# Patient Record
Sex: FEMALE | Race: WHITE | NOT HISPANIC OR LATINO | Employment: OTHER | URBAN - NONMETROPOLITAN AREA
[De-identification: names, ages, dates, MRNs, and addresses within clinical notes are randomized per-mention and may not be internally consistent; named-entity substitution may affect disease eponyms.]

---

## 2017-07-28 ENCOUNTER — OFFICE VISIT (OUTPATIENT)
Dept: MEDICAL GROUP | Facility: CLINIC | Age: 82
End: 2017-07-28
Payer: MEDICARE

## 2017-07-28 VITALS
HEART RATE: 74 BPM | RESPIRATION RATE: 18 BRPM | DIASTOLIC BLOOD PRESSURE: 72 MMHG | TEMPERATURE: 97 F | SYSTOLIC BLOOD PRESSURE: 126 MMHG | HEIGHT: 67 IN | WEIGHT: 197 LBS | BODY MASS INDEX: 30.92 KG/M2 | OXYGEN SATURATION: 96 %

## 2017-07-28 DIAGNOSIS — Z12.39 SCREENING FOR BREAST CANCER: ICD-10-CM

## 2017-07-28 DIAGNOSIS — Z12.11 SCREENING FOR COLON CANCER: ICD-10-CM

## 2017-07-28 DIAGNOSIS — H90.0 CONDUCTIVE HEARING LOSS, BILATERAL: ICD-10-CM

## 2017-07-28 DIAGNOSIS — E66.9 OBESITY (BMI 30-39.9): ICD-10-CM

## 2017-07-28 DIAGNOSIS — G89.29 CHRONIC LEFT-SIDED THORACIC BACK PAIN: ICD-10-CM

## 2017-07-28 DIAGNOSIS — M85.80 OSTEOPENIA, UNSPECIFIED LOCATION: ICD-10-CM

## 2017-07-28 DIAGNOSIS — Z76.89 ESTABLISHING CARE WITH NEW DOCTOR, ENCOUNTER FOR: ICD-10-CM

## 2017-07-28 DIAGNOSIS — M54.6 CHRONIC LEFT-SIDED THORACIC BACK PAIN: ICD-10-CM

## 2017-07-28 DIAGNOSIS — E03.9 ACQUIRED HYPOTHYROIDISM: ICD-10-CM

## 2017-07-28 DIAGNOSIS — R60.9 TRACE EDEMA: ICD-10-CM

## 2017-07-28 DIAGNOSIS — L30.9 DERMATITIS: ICD-10-CM

## 2017-07-28 DIAGNOSIS — Z23 NEED FOR VACCINATION: ICD-10-CM

## 2017-07-28 DIAGNOSIS — Z87.2 HISTORY OF CELLULITIS: ICD-10-CM

## 2017-07-28 PROCEDURE — 99214 OFFICE O/P EST MOD 30 MIN: CPT | Mod: 25 | Performed by: NURSE PRACTITIONER

## 2017-07-28 PROCEDURE — 90472 IMMUNIZATION ADMIN EACH ADD: CPT | Performed by: NURSE PRACTITIONER

## 2017-07-28 PROCEDURE — G0009 ADMIN PNEUMOCOCCAL VACCINE: HCPCS | Performed by: NURSE PRACTITIONER

## 2017-07-28 PROCEDURE — 90670 PCV13 VACCINE IM: CPT | Performed by: NURSE PRACTITIONER

## 2017-07-28 PROCEDURE — 90715 TDAP VACCINE 7 YRS/> IM: CPT | Performed by: NURSE PRACTITIONER

## 2017-07-28 RX ORDER — LEVOTHYROXINE SODIUM 0.03 MG/1
25 TABLET ORAL
Qty: 90 TAB | Refills: 1 | Status: SHIPPED | OUTPATIENT
Start: 2017-07-28 | End: 2018-02-01 | Stop reason: SDUPTHER

## 2017-07-28 RX ORDER — PHENOL 1.4 %
1 AEROSOL, SPRAY (ML) MUCOUS MEMBRANE DAILY
Qty: 30 TAB | Refills: 0 | COMMUNITY
Start: 2017-07-28

## 2017-07-28 RX ORDER — TRIAMCINOLONE ACETONIDE 1 MG/G
1 CREAM TOPICAL 2 TIMES DAILY
Qty: 1 TUBE | Refills: 0 | Status: SHIPPED | OUTPATIENT
Start: 2017-07-28 | End: 2018-09-04

## 2017-07-28 ASSESSMENT — PATIENT HEALTH QUESTIONNAIRE - PHQ9: CLINICAL INTERPRETATION OF PHQ2 SCORE: 0

## 2017-07-28 ASSESSMENT — PAIN SCALES - GENERAL: PAINLEVEL: NO PAIN

## 2017-07-28 NOTE — ASSESSMENT & PLAN NOTE
This is a chronic problem. Per patient, edema is always resolved in the AM, worsened with activity. No history of HTN.

## 2017-07-28 NOTE — ASSESSMENT & PLAN NOTE
This is a chronic problem which has been well controlled with Levothyroxine 25 mcg. Last TSH was a year ago, WNL. Patient denies any concerns or complaints today.

## 2017-07-28 NOTE — ASSESSMENT & PLAN NOTE
Patient reports history of LE cellulitis due to dry skin. None today. Reports she uses lotion daily.

## 2017-07-28 NOTE — PATIENT INSTRUCTIONS
Please obtain fasting labs prior to your next appointment. You may report to our clinic here in [place] Monday-Friday from 7:00am - 9:00am.     Please arrive fasting. Please do not eat or drink anything other than water for 8 hours prior to your arrival for labs.     Your medical care was provided today by: PORSCHE Santiago    Thank You for the opportunity to serve you.    You may receive a brief survey in the mail shortly regarding your visit today. Please take a few moments to complete the survey and return it; no postage is necessary. We are working to serve our patient population better, improve customer service and our patients overall experience and your input can help us to accomplish this. We thank you for your help and for the opportunity to serve you today and in the future.     Special Instructions:  Always call 9-1-1 immediately if you develop a life threatening emergency.    Unless told otherwise please take all medications as directed and complete prescription therapies.     Watch for the following signs that require additional evaluation: progressive lethargy or unresponsiveness, localized pain (chest, abdomen), shortness of breath, painful breathing, progressive vomiting with weakness, bloody stools, or new rash.     If you are prescribed pain medication or any other medication that is sedating, do not take medication before or while operating a vehicle or heavy machinery or equipment due to potential side effects such as drowsiness and/or dizziness.

## 2017-07-28 NOTE — ASSESSMENT & PLAN NOTE
This is a new problem. Per patient, a patch of itchy skin on upper right arm over the last few months, it comes and goes. No pain, bleeding, no change in mole or lesion to report.

## 2017-07-28 NOTE — MR AVS SNAPSHOT
"        Carolina Lamineportillowhitney   2017 1:00 PM   Office Visit   MRN: [id number]    Department:  [facility] Phone:  [phone]    Description:  Female : 1932   Provider:  CHRIS Saleh           Reason for Visit     Establish Care     Orders Needed mammo/bone density/fit test    Immunizations tdap/pneumonia      Allergies as of 2017     Allergen Noted Reactions    Other Drug 2016       ALL NARCOTICS    Bactrim Ds 2016   Hives      You were diagnosed with     Establishing care with new doctor, encounter for   [878488]       Obesity (BMI 30-39.9)   [625171]       Acquired hypothyroidism   [7259568]       Chronic left-sided thoracic back pain   [4073993]       History of cellulitis   [6087817]       Osteopenia, unspecified location   [5455587]       Conductive hearing loss, bilateral   [389.06.ICD-9-CM]       Trace edema   [9424538]       Dermatitis   [103535]       Screening for breast cancer   [974010]       Screening for colon cancer   [596754]       Need for vaccination   [684534]         Vital Signs     Blood Pressure Pulse Temperature Respirations Height Weight    126/72 mmHg 74 36.1 °C (97 °F) 18 1.702 m (5' 7.01\") 89.359 kg (197 lb)    Body Mass Index Oxygen Saturation Smoking Status             30.85 kg/m2 96% Never Smoker          Basic Information     Date Of Birth Sex Race Ethnicity Preferred Language    1932 Female White Non- English      Problem List              ICD-10-CM Priority Class Noted - Resolved    Chronic left-sided thoracic back pain M54.6, G89.29   5/3/2016 - Present    Acquired hypothyroidism E03.9   5/3/2016 - Present    Obesity (BMI 30-39.9) E66.9   2017 - Present    History of cellulitis Z87.2   2017 - Present    Osteopenia M85.80   2017 - Present    Conductive hearing loss, bilateral H90.0   2017 - Present    Trace edema R60.9   2017 - Present    Dermatitis L30.9   2017 - Present      Health " Maintenance        Date Due Completion Dates    COLON CANCER SCREENING ANNUAL FIT 7/25/1982 ---    BONE DENSITY 7/25/1997 ---    MAMMOGRAM 11/10/2016 11/10/2015, 9/2/2011    PAP SMEAR 8/1/2018 (Originally 7/25/1953) ---    IMM ZOSTER VACCINE 8/1/2018 (Originally 7/25/1992) ---    IMM INFLUENZA (1) 9/1/2017 ---    IMM PNEUMOCOCCAL 65+ (ADULT) LOW/MEDIUM RISK SERIES (2 of 2 - PPSV23) 7/28/2018 7/28/2017    IMM DTaP/Tdap/Td Vaccine (2 - Td) 7/28/2027 7/28/2017            Current Immunizations     13-VALENT PCV PREVNAR 7/28/2017    Tdap Vaccine 7/28/2017      Below and/or attached are the medications your provider expects you to take. Review all of your home medications and newly ordered medications with your provider and/or pharmacist. Follow medication instructions as directed by your provider and/or pharmacist. Please keep your medication list with you and share with your provider. Update the information when medications are discontinued, doses are changed, or new medications (including over-the-counter products) are added; and carry medication information at all times in the event of emergency situations     Allergies:  OTHER DRUG - (reactions not documented)     BACTRIM DS - Hives               Medications  Valid as of: July 28, 2017 -  1:48 PM    Generic Name Brand Name Tablet Size Instructions for use    Levothyroxine Sodium (Tab) SYNTHROID 25 MCG Take 1 Tab by mouth Every morning on an empty stomach. ON EMPTY STOMACH        Multiple Vitamins-Minerals (Tab) MULTIVITAMIN ADULTS 50+  Take 1 Tab by mouth every day.        Triamcinolone Acetonide (Cream) KENALOG 0.1 % Apply 1 Application to affected area(s) 2 times a day.        .                 Medicines prescribed today were sent to:     [facility] #[id number] - [place] - [address]    [address] [place] [id number]    Phone: [phone] Fax: [phone]    Open 24 Hours?: No      Medication refill instructions:       If your prescription bottle indicates  you have medication refills left, it is not necessary to call your provider’s office. Please contact your pharmacy and they will refill your medication.    If your prescription bottle indicates you do not have any refills left, you may request refills at any time through one of the following ways: The online [employer] system (except Urgent Care), by calling your provider’s office, or by asking your pharmacy to contact your provider’s office with a refill request. Medication refills are processed only during regular business hours and may not be available until the next business day. Your provider may request additional information or to have a follow-up visit with you prior to refilling your medication.   *Please Note: Medication refills are assigned a new Rx number when refilled electronically. Your pharmacy may indicate that no refills were authorized even though a new prescription for the same medication is available at the pharmacy. Please request the medicine by name with the pharmacy before contacting your provider for a refill.        Your To Do List     Future Labs/Procedures Complete By Expires    COMP METABOLIC PANEL  As directed 7/29/2018    DS-BONE DENSITY STUDY (DEXA)  As directed 1/28/2018    LIPID PROFILE  As directed 7/29/2018    [id number]  As directed 7/28/2018    OCCULT BLOOD FECES IMMUNOASSAY (FIT)  As directed 7/28/2018    TSH WITH REFLEX TO FT4  As directed 7/28/2018      Instructions    Please obtain fasting labs prior to your next appointment. You may report to our clinic here in [place] Monday-Friday from 7:00am - 9:00am.     Please arrive fasting. Please do not eat or drink anything other than water for 8 hours prior to your arrival for labs.     Your medical care was provided today by: PORSCHE Santiago    Thank You for the opportunity to serve you.    You may receive a brief survey in the mail shortly regarding your visit today. Please take a few moments to complete  the survey and return it; no postage is necessary. We are working to serve our patient population better, improve customer service and our patients overall experience and your input can help us to accomplish this. We thank you for your help and for the opportunity to serve you today and in the future.     Special Instructions:  Always call 9-1-1 immediately if you develop a life threatening emergency.    Unless told otherwise please take all medications as directed and complete prescription therapies.     Watch for the following signs that require additional evaluation: progressive lethargy or unresponsiveness, localized pain (chest, abdomen), shortness of breath, painful breathing, progressive vomiting with weakness, bloody stools, or new rash.     If you are prescribed pain medication or any other medication that is sedating, do not take medication before or while operating a vehicle or heavy machinery or equipment due to potential side effects such as drowsiness and/or dizziness.             [employer] Access Code: [email or web address]  Expires: 8/27/2017  1:48 PM    Your email address is not on file at [email or web address].  Email Addresses are required for you to sign up for [employer], please contact [phone] to verify your personal information and to provide your email address prior to attempting to register for [employer].    Carolina Castaneda  [address] [place] [address]    [employer]  A secure, online tool to manage your health information     [email or web address]’s [employer]® is a secure, online tool that connects you to your personalized health information from the privacy of your home -- day or night - making it very easy for you to manage your healthcare. Once the activation process is completed, you can even access your medical information using the [employer] srinath, which is available for free in the Apple Srinath store or Google Play store.     To learn more about [employer], visit [email or web address]    There are two  levels of access available (as shown below):   My Chart Features  [facility] Doctor [facility]  Specialists [facility]  Urgent  Care Non-[facility] Primary Care Doctor   Email your healthcare team securely and privately 24/7 X X X    Manage appointments: schedule your next appointment; view details of past/upcoming appointments X      Request prescription refills. X      View recent personal medical records, including lab and immunizations X X X X   View health record, including health history, allergies, medications X X X X   Read reports about your outpatient visits, procedures, consult and ER notes X X X X   See your discharge summary, which is a recap of your hospital and/or ER visit that includes your diagnosis, lab results, and care plan X X  X     How to register for [employer]:  Once your e-mail address has been verified, follow the following steps to sign up for [employer].     1. Go to  [email or web address]  2. Click on the Sign Up Now box, which takes you to the New Member Sign Up page. You will need to provide the following information:  a. Enter your [employer] Access Code exactly as it appears at the top of this page. (You will not need to use this code after you’ve completed the sign-up process. If you do not sign up before the expiration date, you must request a new code.)   b. Enter your date of birth.   c. Enter your home email address.   d. Click Submit, and follow the next screen’s instructions.  3. Create a [employer] ID. This will be your [employer] login ID and cannot be changed, so think of one that is secure and easy to remember.  4. Create a [employer] password. You can change your password at any time.  5. Enter your Password Reset Question and Answer. This can be used at a later time if you forget your password.   6. Enter your e-mail address. This allows you to receive e-mail notifications when new information is available in [employer].  7. Click Sign Up. You can now view your health information.    For  assistance activating your [employer] account, call [phone]

## 2017-07-28 NOTE — ASSESSMENT & PLAN NOTE
This is a chronic intermittent problem which is only exacerbated by strenuous activity per patient. She does work outside in her garden often, tries to stay active. OTC Tylenol as needed.

## 2017-07-28 NOTE — PROGRESS NOTES
Chief Complaint   Patient presents with   • Establish Care   • Orders Needed     mammo/bone density/fit test   • Immunizations     tdap/pneumonia        Carolina Castaneda is a 85 y.o. female here today to establish care and to discuss the evaluation and management of:    HPI:      Acquired hypothyroidism  This is a chronic problem which has been well controlled with Levothyroxine 25 mcg. Last TSH was a year ago, WNL. Patient denies any concerns or complaints today.     Chronic left-sided thoracic back pain  This is a chronic intermittent problem which is only exacerbated by strenuous activity per patient. She does work outside in her garden often, tries to stay active. OTC Tylenol as needed.     Conductive hearing loss, bilateral  This is a chronic problem. No hearing aids or interest in seeing ENT.     Dermatitis  This is a new problem. Per patient, a patch of itchy skin on upper right arm over the last few months, it comes and goes. No pain, bleeding, no change in mole or lesion to report.     History of cellulitis  Patient reports history of LE cellulitis due to dry skin. None today. Reports she uses lotion daily.     Osteopenia  Per patient, last Bone Scan showed osteopenia. She does not take Ca or Vit D, only a daily multi. Denies any recent fall or fracture.     Trace edema  This is a chronic problem. Per patient, edema is always resolved in the AM, worsened with activity. No history of HTN.       Current medicines (including changes today)  Current Outpatient Prescriptions   Medication Sig Dispense Refill   • Multiple Vitamins-Minerals (MULTIVITAMIN ADULTS 50+) Tab Take 1 Tab by mouth every day. 30 Tab 0   • levothyroxine (SYNTHROID) 25 MCG Tab Take 1 Tab by mouth Every morning on an empty stomach. ON EMPTY STOMACH 90 Tab 1   • triamcinolone acetonide (KENALOG) 0.1 % Cream Apply 1 Application to affected area(s) 2 times a day. 1 Tube 0     No current facility-administered medications for this visit.        She  has a past medical history of Left-sided thoracic back pain (5/3/2016) and Thyroid disease.    She  has past surgical history that includes abdominal exploration (1970); appendectomy; and shoulder arthroscopy w/ rotator cuff repair (Left, ).     Social History   Substance Use Topics   • Smoking status: Never Smoker    • Smokeless tobacco: Never Used   • Alcohol Use: 0.0 oz/week     0 Standard drinks or equivalent per week       Social History     Social History Narrative       Family History   Problem Relation Age of Onset   • No Known Problems Mother    • No Known Problems Father        Family Status   Relation Status Death Age   • Mother     • Father         ROS   Constitutional: Denies fever, chills, or sweats  Eyes: negative for visual blurring, double vision, eye pain, floaters and discharge from eyes  ENT: negative for tinnitus, vertigo, frequent URI's, sinus trouble, persistent sore throat  Respiratory: negative for persistent cough, hemoptysis, dyspnea, wheezing  Cardiovascular: negative for palpitations, chest pain, or peripheral edema.  Gastrointestinal: negative for poor appetite, dysphagia, nausea, heartburn, abdominal pain, hemorrhoids, constipation or diarrhea  Genitourinary: negative for dysuria. negative for abnormal uterine bleeding, vaginal discharge   Musculoskeletal: negative for joint swelling and muscle pain/ soreness  Skin: negative for rash, scaling, hair or nail changes. Positive for patch of itching skin on right upper arm.   Neurologic: negative for migraine headaches, involuntary movements or tremor  Psychiatric: negative for excessive alcohol consumption or illegal drug usage, sleep disturbance, anxiety, depression  Hematologic/Lymphatic/Immunologic: negative for unusual bruising, swollen glands  Endocrine: negative for temperature intolerance, polydipsia, polyuria, unintentional weight changes.        Objective:     Blood pressure 126/72, pulse 74,  "temperature 36.1 °C (97 °F), resp. rate 18, height 1.702 m (5' 7.01\"), weight 89.359 kg (197 lb), SpO2 96 %. Body mass index is 30.85 kg/(m^2).    Physical Exam:   Constitutional: Alert, no distress. Hard of hearing.   Eye: Equal, round and reactive, conjunctiva clear, lids normal.  ENMT: Lips without lesions, good dentition, oropharynx clear. TM's normal without erythema, canals patent. Normal appearance of external nose, no drainage noted.   Neck: Trachea midline, no masses, no thyromegaly. No cervical or supraclavicular lymphadenopathy.   Respiratory: Unlabored respiratory effort, lungs clear to auscultation, no wheezes, no ronchi.  Cardiovascular: Normal S1, S2, no murmur, trace edema noted LE bilaterally. Capillary refill < 2 seconds in UE bilaterally.   Abdomen: Soft, non-tender, no masses, no hepatosplenomegaly. Normal bowel sounds.   Skin: Warm, dry, good turgor, no rashes in visible areas. There is what appears to be a small patch of dry skin on right upper arm, no mole or lesion?   Neuro: CN II-XII grossly intact, normal sensation in extremities.   Psych: Alert and oriented x3, normal affect and mood.      Assessment and Plan:   The following treatment plan was discussed    1. Establishing care with new doctor, encounter for    2. Obesity (BMI 30-39.9)  She has lost weight, trying to eat healthier and stay active per her report.   - Patient identified as having weight management issue.  Appropriate orders and counseling given.  - LIPID PROFILE; Future    3. Acquired hypothyroidism  Advised to repeat labs, will contact with results. Continue with levothyroxine 20 mcg daily.   - TSH WITH REFLEX TO FT4; Future  - COMP METABOLIC PANEL; Future    4. Chronic left-sided thoracic back pain  Stable. Reviewed proper body mechanics and lifting techniques, low back exercises and role of gentle stretching in preventing musculoskeletal injury.      5. History of cellulitis  No s/s of infection today.     6. Osteopenia, " unspecified location  - DS-BONE DENSITY STUDY (DEXA); Future  - Multiple Vitamins-Minerals (MULTIVITAMIN ADULTS 50+) Tab; Take 1 Tab by mouth every day.  Dispense: 30 Tab; Refill: 0    7. Conductive hearing loss, bilateral    8. Trace edema  Discussed s/s to seek emergent care.     9. Dermatitis  Trial cream, advised to RTC if symptoms worsen or do not resolve.     10. Screening for breast cancer  She does wish to continue to obtain mammo, no history of abnormal.   - [id number]; Future    11. Screening for colon cancer  - OCCULT BLOOD FECES IMMUNOASSAY (FIT); Future    12. Need for vaccination  - Tdap =>8yo IM  - PNEUMOCOCCAL CONJUGATE VACCINE 13-VALENT       Reviewed indication, dosage, usage and potential adverse effects of prescribed medications. Patient appears to understand, verbalizes understanding and is willing to try medications as prescribed.      Reviewed risks and benefits of treatment plan. Patient verbally agrees to plan of care.     Records requested.    Followup: Return in about 2 months (around 9/28/2017) for Medicare Annual Wellness .    JO Saleh.REVONNE.     PLEASE NOTE: This dictation was created using voice recognition software. I have made every reasonable attempt to correct obvious errors, but I expect that there may be errors of grammar and possibly content that I did not discover prior finalizing this note.

## 2017-07-28 NOTE — ASSESSMENT & PLAN NOTE
Per patient, last Bone Scan showed osteopenia. She does not take Ca or Vit D, only a daily multi. Denies any recent fall or fracture.

## 2017-08-06 ENCOUNTER — HOSPITAL ENCOUNTER (OUTPATIENT)
Facility: MEDICAL CENTER | Age: 82
End: 2017-08-06
Attending: NURSE PRACTITIONER
Payer: MEDICARE

## 2017-08-06 PROCEDURE — 82274 ASSAY TEST FOR BLOOD FECAL: CPT

## 2017-08-09 DIAGNOSIS — Z12.11 SCREENING FOR COLON CANCER: ICD-10-CM

## 2017-08-09 LAB — HEMOCCULT STL QL IA: NEGATIVE

## 2017-09-28 ENCOUNTER — TELEPHONE (OUTPATIENT)
Dept: MEDICAL GROUP | Facility: CLINIC | Age: 82
End: 2017-09-28

## 2017-10-04 ENCOUNTER — TELEPHONE (OUTPATIENT)
Dept: MEDICAL GROUP | Facility: CLINIC | Age: 82
End: 2017-10-04

## 2017-10-12 ENCOUNTER — TELEPHONE (OUTPATIENT)
Dept: MEDICAL GROUP | Facility: CLINIC | Age: 82
End: 2017-10-12

## 2017-10-12 DIAGNOSIS — H90.0 CONDUCTIVE HEARING LOSS, BILATERAL: ICD-10-CM

## 2017-10-12 NOTE — TELEPHONE ENCOUNTER
Pt called and stated she is waiting for  REFERRAL TO AUDIOLOGY , the last one she had was from [facility] and has   so it need to be redone.  She wants to go to [facility]. She has been the one doing her hearing test in the past Please advise

## 2017-11-06 ENCOUNTER — OFFICE VISIT (OUTPATIENT)
Dept: MEDICAL GROUP | Facility: CLINIC | Age: 82
End: 2017-11-06
Payer: MEDICARE

## 2017-11-06 VITALS
SYSTOLIC BLOOD PRESSURE: 138 MMHG | OXYGEN SATURATION: 95 % | TEMPERATURE: 97.7 F | HEART RATE: 67 BPM | DIASTOLIC BLOOD PRESSURE: 84 MMHG | BODY MASS INDEX: 32.02 KG/M2 | WEIGHT: 204 LBS | HEIGHT: 67 IN

## 2017-11-06 DIAGNOSIS — Z23 NEED FOR VACCINATION: ICD-10-CM

## 2017-11-06 DIAGNOSIS — L03.116 BILATERAL CELLULITIS OF LOWER LEG: ICD-10-CM

## 2017-11-06 DIAGNOSIS — L03.115 BILATERAL CELLULITIS OF LOWER LEG: ICD-10-CM

## 2017-11-06 PROCEDURE — 90662 IIV NO PRSV INCREASED AG IM: CPT | Performed by: PHYSICIAN ASSISTANT

## 2017-11-06 PROCEDURE — 99213 OFFICE O/P EST LOW 20 MIN: CPT | Mod: 25 | Performed by: PHYSICIAN ASSISTANT

## 2017-11-06 PROCEDURE — G0008 ADMIN INFLUENZA VIRUS VAC: HCPCS | Performed by: PHYSICIAN ASSISTANT

## 2017-11-06 RX ORDER — CEPHALEXIN 250 MG/1
1 TABLET ORAL 4 TIMES DAILY
Qty: 20 TAB | Refills: 0 | Status: SHIPPED | OUTPATIENT
Start: 2017-11-06 | End: 2017-11-27

## 2017-11-06 RX ORDER — FUROSEMIDE 20 MG/1
20 TABLET ORAL 2 TIMES DAILY
Qty: 60 TAB | Refills: 0 | Status: SHIPPED | OUTPATIENT
Start: 2017-11-06 | End: 2017-11-28

## 2017-11-07 NOTE — ASSESSMENT & PLAN NOTE
Patient reports history of LE cellulitis due to dry skin. Reports she uses lotion daily. She states that over the past week or so, the swelling in her legs has gotten much worse. She states that it is usually better in the morning and worse after standing. She has tried compression stocking in the past but has a hard time getting these on and her son won't help her with this. She is not having systemic symptoms including no fever, chills or general malaise. She states that her legs get swollen, reduce in size and go back and forth but she usually gets antibiotics when they get this big and don't go back. She states this started after a shoulder surgery about 7 years ago.

## 2017-11-07 NOTE — PROGRESS NOTES
Chief Complaint   Patient presents with   • Edema     lower legs swollen       HISTORY OF PRESENT ILLNESS: Patient is a 85 y.o. female established patient who presents today for evaluation and management of:    Bilateral cellulitis of lower leg  Patient reports history of LE cellulitis due to dry skin. Reports she uses lotion daily. She states that over the past week or so, the swelling in her legs has gotten much worse. She states that it is usually better in the morning and worse after standing. She has tried compression stocking in the past but has a hard time getting these on and her son won't help her with this. She is not having systemic symptoms including no fever, chills or general malaise. She states that her legs get swollen, reduce in size and go back and forth but she usually gets antibiotics when they get this big and don't go back. She states this started after a shoulder surgery about 7 years ago.        Patient Active Problem List    Diagnosis Date Noted   • Obesity (BMI 30-39.9) 07/28/2017   • Bilateral cellulitis of lower leg 07/28/2017   • Osteopenia 07/28/2017   • Conductive hearing loss, bilateral 07/28/2017   • Trace edema 07/28/2017   • Dermatitis 07/28/2017   • Chronic left-sided thoracic back pain 05/03/2016   • Cellulitis of both lower extremities 05/03/2016   • Acquired hypothyroidism 05/03/2016       Allergies:Other drug and Bactrim ds    Current Outpatient Prescriptions   Medication Sig Dispense Refill   • furosemide (LASIX) 20 MG Tab Take 1 Tab by mouth 2 times a day. 60 Tab 0   • Cephalexin 250 MG Tab Take 1 Tab by mouth 4 times a day. 20 Tab 0   • levothyroxine (SYNTHROID) 25 MCG Tab Take 1 Tab by mouth Every morning on an empty stomach. ON EMPTY STOMACH 90 Tab 1   • triamcinolone acetonide (KENALOG) 0.1 % Cream Apply 1 Application to affected area(s) 2 times a day. 1 Tube 0   • Multiple Vitamins-Minerals (MULTIVITAMIN ADULTS 50+) Tab Take 1 Tab by mouth every day. 30 Tab 0     No  "current facility-administered medications for this visit.        Social History   Substance Use Topics   • Smoking status: Never Smoker   • Smokeless tobacco: Never Used   • Alcohol use 0.0 oz/week       Family Status   Relation Status   • Mother    • Father      Family History   Problem Relation Age of Onset   • No Known Problems Mother    • No Known Problems Father        Review of Systems:   Constitutional: Negative for fever, chills, weight loss and malaise/fatigue.   Respiratory: Negative for cough, shortness of breath.    Cardiovascular: Negative for chest pain, palpitations, orthopnea and positive for leg swelling.   Skin: Positive for red, swollen itchy, scaly legs. Negative for rash and itching.   Neurological: Negative for dizziness, tingling, tremors, sensory change, focal weakness and headaches.     Exam:  Blood pressure 138/84, pulse 67, temperature 36.5 °C (97.7 °F), height 1.702 m (5' 7\"), weight 92.5 kg (204 lb), SpO2 95 %.  Body mass index is 31.95 kg/m².  General:  Overweight female in NAD  Head: is grossly normal.  Neck: Supple without masses. Thyroid is not visibly enlarged.  Pulmonary: Clear to ausculation. Normal effort. No rales, ronchi, or wheezing.  Cardiovascular: Regular rate and rhythm without murmur. Carotid and radial pulses are intact and equal bilaterally.  Extremities: Positive for 3+/4 pitting edema in bilateral lower extremities worse on right. Warm to touch but nontender. No oozing. Flaking dry skin present. no clubbing, cyanosis    Medical decision-making and discussion:  1. Bilateral cellulitis of lower leg  Patient advised to keep her legs elevated as much as possible and to obtain compression stocking if she is able.   - furosemide (LASIX) 20 MG Tab; Take 1 Tab by mouth 2 times a day.  Dispense: 60 Tab; Refill: 0  - Cephalexin 250 MG Tab; Take 1 Tab by mouth 4 times a day.  Dispense: 20 Tab; Refill: 0    2. Need for vaccination  - INFLUENZA VACCINE, HIGH DOSE " (65+ ONLY)      Please note that this dictation was created using voice recognition software. I have made every reasonable attempt to correct obvious errors, but I expect that there are errors of grammar and possibly content that I did not discover before finalizing the note.      Return in about 3 weeks (around 11/27/2017) for leg swelling.

## 2017-11-27 ENCOUNTER — OFFICE VISIT (OUTPATIENT)
Dept: MEDICAL GROUP | Facility: CLINIC | Age: 82
End: 2017-11-27
Payer: MEDICARE

## 2017-11-27 VITALS
HEART RATE: 69 BPM | OXYGEN SATURATION: 94 % | SYSTOLIC BLOOD PRESSURE: 140 MMHG | DIASTOLIC BLOOD PRESSURE: 80 MMHG | WEIGHT: 204 LBS | BODY MASS INDEX: 32.02 KG/M2 | TEMPERATURE: 98.5 F | RESPIRATION RATE: 20 BRPM | HEIGHT: 67 IN

## 2017-11-27 DIAGNOSIS — L03.116 BILATERAL CELLULITIS OF LOWER LEG: ICD-10-CM

## 2017-11-27 DIAGNOSIS — L03.115 BILATERAL CELLULITIS OF LOWER LEG: ICD-10-CM

## 2017-11-27 PROBLEM — R60.9 TRACE EDEMA: Status: RESOLVED | Noted: 2017-07-28 | Resolved: 2017-11-27

## 2017-11-27 PROCEDURE — 99212 OFFICE O/P EST SF 10 MIN: CPT | Performed by: PHYSICIAN ASSISTANT

## 2017-11-27 RX ORDER — AMOXICILLIN 500 MG/1
500 CAPSULE ORAL 3 TIMES DAILY
Qty: 30 CAP | Refills: 0 | Status: SHIPPED | OUTPATIENT
Start: 2017-11-27 | End: 2018-05-29

## 2017-11-28 NOTE — ASSESSMENT & PLAN NOTE
Patient reports history of LE cellulitis due to dry skin. Reports she uses lotion daily. She states that over the past week or so, the swelling in her legs has gotten much better with use of cephalexin but it still isn't completely gone. She remembered after she went home that she is allergic to furosemide so she did not take this medication as prescribed. She states that it is usually better in the morning and worse after standing. She has tried compression stocking in the past but has a hard time getting these on and her son won't help her with this. She is not having systemic symptoms including no fever, chills or general malaise. She states that her legs get swollen, reduce in size and go back and forth but she usually gets amoxicillin when they get this big and don't go back. She states this started after a shoulder surgery about 7 years ago.

## 2017-11-28 NOTE — PROGRESS NOTES
Chief Complaint   Patient presents with   • Leg Swelling     allergy to Lasix       HISTORY OF PRESENT ILLNESS: Patient is a 85 y.o. female established patient who presents today for evaluation and management of:    Bilateral cellulitis of lower leg  Patient reports history of LE cellulitis due to dry skin. Reports she uses lotion daily. She states that over the past week or so, the swelling in her legs has gotten much better with use of cephalexin but it still isn't completely gone. She remembered after she went home that she is allergic to furosemide so she did not take this medication as prescribed. She states that it is usually better in the morning and worse after standing. She has tried compression stocking in the past but has a hard time getting these on and her son won't help her with this. She is not having systemic symptoms including no fever, chills or general malaise. She states that her legs get swollen, reduce in size and go back and forth but she usually gets amoxicillin when they get this big and don't go back. She states this started after a shoulder surgery about 7 years ago.        Patient Active Problem List    Diagnosis Date Noted   • Obesity (BMI 30-39.9) 07/28/2017   • Bilateral cellulitis of lower leg 07/28/2017   • Osteopenia 07/28/2017   • Conductive hearing loss, bilateral 07/28/2017   • Dermatitis 07/28/2017   • Chronic left-sided thoracic back pain 05/03/2016   • Acquired hypothyroidism 05/03/2016       Allergies:Other drug; Bactrim ds; and Lasix [furosemide]    Current Outpatient Prescriptions   Medication Sig Dispense Refill   • amoxicillin (AMOXIL) 500 MG Cap Take 1 Cap by mouth 3 times a day. 30 Cap 0   • Multiple Vitamins-Minerals (MULTIVITAMIN ADULTS 50+) Tab Take 1 Tab by mouth every day. 30 Tab 0   • levothyroxine (SYNTHROID) 25 MCG Tab Take 1 Tab by mouth Every morning on an empty stomach. ON EMPTY STOMACH 90 Tab 1   • triamcinolone acetonide (KENALOG) 0.1 % Cream Apply 1  "Application to affected area(s) 2 times a day. 1 Tube 0     No current facility-administered medications for this visit.        Social History   Substance Use Topics   • Smoking status: Never Smoker   • Smokeless tobacco: Never Used   • Alcohol use 0.0 oz/week       Family Status   Relation Status   • Mother    • Father      Family History   Problem Relation Age of Onset   • No Known Problems Mother    • No Known Problems Father        Review of Systems:   Constitutional: Negative for fever, chills, weight loss and malaise/fatigue.   Respiratory: Negative for cough, sputum production, shortness of breath and wheezing.    Cardiovascular: Negative for chest pain, orthopnea and positive for leg swelling.   Skin:  Positive for redness and swelling with itchiness. Negative for rash.   Neurological: Negative for dizziness, tingling, tremors, sensory change, focal weakness and headaches.     Exam:  Blood pressure 140/80, pulse 69, temperature 36.9 °C (98.5 °F), resp. rate 20, height 1.702 m (5' 7\"), weight 92.5 kg (204 lb), SpO2 94 %.  Body mass index is 31.95 kg/m².  General:  Obese female in NAD  Head: is grossly normal.  Neck: Thyroid is not visibly enlarged.  Pulmonary: Normal effort at rest and with mild exertion.  Cardiovascular: Radial pulses are intact and equal bilaterally.  Extremities: 2+/4 pitting edema bilateral lower extremities. No clubbing, cyanosis.     Medical decision-making and discussion:  1. Bilateral cellulitis of lower leg  Patient advised that should the following medication not alleviate her symptoms completely, she should return for follow-up visit in the next couple of weeks in order to start a different diuretic therapy.  - amoxicillin (AMOXIL) 500 MG Cap; Take 1 Cap by mouth 3 times a day.  Dispense: 30 Cap; Refill: 0      Please note that this dictation was created using voice recognition software. I have made every reasonable attempt to correct obvious errors, but I expect " that there are errors of grammar and possibly content that I did not discover before finalizing the note.      Return if symptoms worsen or fail to improve.

## 2018-02-01 RX ORDER — LEVOTHYROXINE SODIUM 0.03 MG/1
25 TABLET ORAL
Qty: 90 TAB | Refills: 0 | Status: SHIPPED | OUTPATIENT
Start: 2018-02-01 | End: 2018-05-02 | Stop reason: SDUPTHER

## 2018-02-01 NOTE — TELEPHONE ENCOUNTER
Needs to repeat labs for continued refills. Please call patient and advise.   Antwon Forrester, APRN

## 2018-05-02 RX ORDER — LEVOTHYROXINE SODIUM 0.03 MG/1
25 TABLET ORAL
Qty: 30 TAB | Refills: 0 | Status: SHIPPED | OUTPATIENT
Start: 2018-05-02 | End: 2018-05-29 | Stop reason: SDUPTHER

## 2018-05-02 NOTE — TELEPHONE ENCOUNTER
Was the patient seen in the last year in this department? Yes     Does patient have an active prescription for medications requested? Yes     Received Request Via: Patient       LABS IN MEDIA PLEASE ADVISE.

## 2018-05-02 NOTE — TELEPHONE ENCOUNTER
30 day refill, TSH is a little elevated but this will get her to her appt next week so we can discuss.   PORSCHE Santiago

## 2018-05-29 ENCOUNTER — OFFICE VISIT (OUTPATIENT)
Dept: MEDICAL GROUP | Facility: CLINIC | Age: 83
End: 2018-05-29
Payer: MEDICARE

## 2018-05-29 VITALS
RESPIRATION RATE: 16 BRPM | DIASTOLIC BLOOD PRESSURE: 74 MMHG | SYSTOLIC BLOOD PRESSURE: 132 MMHG | TEMPERATURE: 97.2 F | BODY MASS INDEX: 31.39 KG/M2 | WEIGHT: 200 LBS | HEART RATE: 64 BPM | OXYGEN SATURATION: 96 % | HEIGHT: 67 IN

## 2018-05-29 DIAGNOSIS — H90.0 CONDUCTIVE HEARING LOSS, BILATERAL: ICD-10-CM

## 2018-05-29 DIAGNOSIS — E03.9 ACQUIRED HYPOTHYROIDISM: ICD-10-CM

## 2018-05-29 DIAGNOSIS — E66.9 OBESITY (BMI 30-39.9): ICD-10-CM

## 2018-05-29 DIAGNOSIS — M85.80 OSTEOPENIA, UNSPECIFIED LOCATION: ICD-10-CM

## 2018-05-29 PROBLEM — L03.116 BILATERAL CELLULITIS OF LOWER LEG: Status: RESOLVED | Noted: 2017-07-28 | Resolved: 2018-05-29

## 2018-05-29 PROBLEM — L03.115 BILATERAL CELLULITIS OF LOWER LEG: Status: RESOLVED | Noted: 2017-07-28 | Resolved: 2018-05-29

## 2018-05-29 PROCEDURE — 99213 OFFICE O/P EST LOW 20 MIN: CPT | Performed by: NURSE PRACTITIONER

## 2018-05-29 RX ORDER — LEVOTHYROXINE SODIUM 0.03 MG/1
12.5 TABLET ORAL
Qty: 30 TAB | Refills: 2 | Status: SHIPPED | OUTPATIENT
Start: 2018-05-29 | End: 2018-05-29 | Stop reason: SDUPTHER

## 2018-05-29 RX ORDER — LEVOTHYROXINE SODIUM 0.07 MG/1
75 TABLET ORAL
Qty: 30 TAB | Refills: 2 | Status: SHIPPED | OUTPATIENT
Start: 2018-05-29 | End: 2018-09-04 | Stop reason: SDUPTHER

## 2018-05-30 NOTE — ASSESSMENT & PLAN NOTE
This is a chronic problem. She is followed by Dr. Chacon and she admits she needs hearing aids but she cannot afford them. She does hear fairly well in clinic today.

## 2018-05-30 NOTE — ASSESSMENT & PLAN NOTE
This is a chronic problem which has been well controlled with Levothyroxine 25 mcg in the past, however, her most TSH is elevated and she does report feeling more tired lately.

## 2018-05-30 NOTE — PROGRESS NOTES
Subjective:     Carolina Castaneda is a 85 y.o. female here today for evaluation and management of the following:     Acquired hypothyroidism  This is a chronic problem which has been well controlled with Levothyroxine 25 mcg in the past, however, her most TSH is elevated and she does report feeling more tired lately.    Conductive hearing loss, bilateral  This is a chronic problem. She is followed by Dr. Chacon and she admits she needs hearing aids but she cannot afford them. She does hear fairly well in clinic today.     Osteopenia  Per patient, last Bone Scan showed osteopenia. She does not take Ca or Vit D, only a daily multi. Denies any recent fall or fracture. She does report she did get done DEXA scan, we do not have these records to review.        Current medicines (including changes today)  Current Outpatient Prescriptions   Medication Sig Dispense Refill   • levothyroxine (SYNTHROID) 75 MCG Tab Take 1 Tab by mouth Every morning on an empty stomach. ON EMPTY STOMACH 30 Tab 2   • Multiple Vitamins-Minerals (MULTIVITAMIN ADULTS 50+) Tab Take 1 Tab by mouth every day. 30 Tab 0   • triamcinolone acetonide (KENALOG) 0.1 % Cream Apply 1 Application to affected area(s) 2 times a day. 1 Tube 0     No current facility-administered medications for this visit.        She  has a past medical history of Left-sided thoracic back pain (5/3/2016) and Thyroid disease.    She  has a past surgical history that includes abdominal exploration (1970); appendectomy; and shoulder arthroscopy w/ rotator cuff repair (Left, 2010).     Social History     Social History   • Marital status: Unknown     Spouse name: N/A   • Number of children: N/A   • Years of education: N/A     Social History Main Topics   • Smoking status: Never Smoker   • Smokeless tobacco: Never Used   • Alcohol use 0.0 oz/week   • Drug use: No   • Sexual activity: No     Other Topics Concern   • Not on file     Social History Narrative   • No narrative on file  "      Family History   Problem Relation Age of Onset   • No Known Problems Mother    • No Known Problems Father          ROS  Positive for fatigue.   No fever, no chest pain, no shortness of breath, no abdominal pain, no rashes    All other systems reviewed and are negative.        Objective:     Blood pressure 132/74, pulse 64, temperature 36.2 °C (97.2 °F), resp. rate 16, height 1.702 m (5' 7\"), weight 90.7 kg (200 lb), SpO2 96 %. Body mass index is 31.32 kg/m².    Physical Exam:   Constitutional: Alert, no distress.  Eye: Equal, round and reactive, conjunctiva clear, lids normal.   ENMT: Lips without lesions, oropharynx clear.   Neck: Trachea midline, no masses, no thyromegaly. No cervical or supraclavicular lymphadenopathy  Respiratory: Unlabored respiratory effort, lungs clear to auscultation, no wheezes, no ronchi.  Cardiovascular: Normal S1, S2, no murmur, no edema.   Abdomen: Soft, non-tender, no masses, no hepatosplenomegaly. Normal bowel sounds.   Skin: Warm, dry, good turgor, no rashes in visible areas.  Psych: Alert and oriented x3, normal affect and mood.        Assessment and Plan:   The following treatment plan was discussed    1. Acquired hypothyroidism  Will increase dosing and plan to recheck in 6-8 weeks.   - TSH WITH REFLEX TO FT4; Future  - levothyroxine (SYNTHROID) 75 MCG Tab; Take 1 Tab by mouth Every morning on an empty stomach. ON EMPTY STOMACH  Dispense: 30 Tab; Refill: 2    2. Obesity (BMI 30-39.9)  - Patient identified as having weight management issue.  Appropriate orders and counseling given.    3. Conductive hearing loss, bilateral    4. Osteopenia, unspecified location  Will attempt to obtain records.       Reviewed indication, dosage, usage and potential adverse effects of prescribed medications. Patient appears to understand, verbalizes understanding and is willing to try medications as prescribed.      Reviewed risks and benefits of treatment plan. Patient verbally agrees to plan " of care.       Followup: Return in about 2 months (around 7/29/2018).    CHAD Saleh.     PLEASE NOTE: This dictation was created using voice recognition software. I have made every reasonable attempt to correct obvious errors, but I expect that there may be errors of grammar and possibly content that I did not discover prior finalizing this note.

## 2018-05-30 NOTE — ASSESSMENT & PLAN NOTE
Per patient, last Bone Scan showed osteopenia. She does not take Ca or Vit D, only a daily multi. Denies any recent fall or fracture. She does report she did get done DEXA scan, we do not have these records to review.

## 2018-05-30 NOTE — PATIENT INSTRUCTIONS
Your medical care was provided today by: PORSCHE Santiago    Thank You for the opportunity to serve you.    You may receive a brief survey in the mail shortly regarding your visit today. Please take a few moments to complete the survey and return it; no postage is necessary. We are working to serve our patient population better, improve customer service and our patients overall experience and your input can help us to accomplish this. We thank you for your help and for the opportunity to serve you today and in the future.     Special Instructions:  Always call 9-1-1 immediately if you develop a life threatening emergency.    Unless told otherwise please take all medications as directed and complete prescription therapies.     Watch for the following signs that require additional evaluation: progressive lethargy or unresponsiveness, localized pain (chest, abdomen), shortness of breath, painful breathing, progressive vomiting with weakness, bloody stools, or new rash.     If you are prescribed pain medication or any other medication that is sedating, do not take medication before or while operating a vehicle or heavy machinery or equipment due to potential side effects such as drowsiness and/or dizziness.

## 2018-09-04 ENCOUNTER — OFFICE VISIT (OUTPATIENT)
Dept: MEDICAL GROUP | Facility: CLINIC | Age: 83
End: 2018-09-04
Payer: MEDICARE

## 2018-09-04 ENCOUNTER — HOSPITAL ENCOUNTER (OUTPATIENT)
Facility: MEDICAL CENTER | Age: 83
End: 2018-09-04
Attending: NURSE PRACTITIONER
Payer: MEDICARE

## 2018-09-04 VITALS
TEMPERATURE: 97.1 F | HEIGHT: 67 IN | SYSTOLIC BLOOD PRESSURE: 132 MMHG | BODY MASS INDEX: 31.86 KG/M2 | WEIGHT: 203 LBS | OXYGEN SATURATION: 94 % | HEART RATE: 88 BPM | DIASTOLIC BLOOD PRESSURE: 66 MMHG | RESPIRATION RATE: 18 BRPM

## 2018-09-04 DIAGNOSIS — Z91.148 NONCOMPLIANCE WITH MEDICATIONS: ICD-10-CM

## 2018-09-04 DIAGNOSIS — Z23 NEED FOR VACCINATION: ICD-10-CM

## 2018-09-04 DIAGNOSIS — E03.9 ACQUIRED HYPOTHYROIDISM: ICD-10-CM

## 2018-09-04 DIAGNOSIS — Z12.39 SCREENING FOR BREAST CANCER: ICD-10-CM

## 2018-09-04 DIAGNOSIS — M85.80 OSTEOPENIA, UNSPECIFIED LOCATION: ICD-10-CM

## 2018-09-04 DIAGNOSIS — Z12.11 SCREENING FOR COLON CANCER: ICD-10-CM

## 2018-09-04 PROBLEM — L30.9 DERMATITIS: Status: RESOLVED | Noted: 2017-07-28 | Resolved: 2018-09-04

## 2018-09-04 PROCEDURE — G0008 ADMIN INFLUENZA VIRUS VAC: HCPCS | Performed by: NURSE PRACTITIONER

## 2018-09-04 PROCEDURE — 90732 PPSV23 VACC 2 YRS+ SUBQ/IM: CPT | Performed by: NURSE PRACTITIONER

## 2018-09-04 PROCEDURE — G0009 ADMIN PNEUMOCOCCAL VACCINE: HCPCS | Performed by: NURSE PRACTITIONER

## 2018-09-04 PROCEDURE — 90662 IIV NO PRSV INCREASED AG IM: CPT | Performed by: NURSE PRACTITIONER

## 2018-09-04 PROCEDURE — 99214 OFFICE O/P EST MOD 30 MIN: CPT | Mod: 25 | Performed by: NURSE PRACTITIONER

## 2018-09-04 PROCEDURE — 84443 ASSAY THYROID STIM HORMONE: CPT

## 2018-09-04 RX ORDER — LEVOTHYROXINE SODIUM 0.05 MG/1
50 TABLET ORAL
Qty: 90 TAB | Refills: 0 | Status: SHIPPED | OUTPATIENT
Start: 2018-09-04 | End: 2018-12-04 | Stop reason: SDUPTHER

## 2018-09-04 ASSESSMENT — PATIENT HEALTH QUESTIONNAIRE - PHQ9: CLINICAL INTERPRETATION OF PHQ2 SCORE: 0

## 2018-09-05 LAB — TSH SERPL DL<=0.005 MIU/L-ACNC: 2.18 UIU/ML (ref 0.38–5.33)

## 2018-09-06 NOTE — ASSESSMENT & PLAN NOTE
Per patient, last Bone Scan showed osteopenia. She does not take Ca or Vit D, only a daily multi. Denies any recent fall or fracture. She does report she did get done DEXA scan, we still do not have these records to review?

## 2018-09-06 NOTE — ASSESSMENT & PLAN NOTE
This is a chronic problem which has been well controlled with Levothyroxine 25 mcg in the past, however, her most TSH is elevated and she has in the past reported feeling more, so we did increase her medication dosing, however, patient reports that she has continued to feel more tired. She also reports that for some time she took only 'half a pill' and then at times she would take a 'whole pill' she is not certain re: dosing. She does not have her medication bottles with her today. She does complain that she has been more tired still and she has to take frequent naps throughout the day.

## 2018-09-06 NOTE — PROGRESS NOTES
Subjective:     Carolina Castaneda is a 86 y.o. female here today for evaluation and management of the following:     Acquired hypothyroidism  This is a chronic problem which has been well controlled with Levothyroxine 25 mcg in the past, however, her most TSH is elevated and she has in the past reported feeling more, so we did increase her medication dosing, however, patient reports that she has continued to feel more tired. She also reports that for some time she took only 'half a pill' and then at times she would take a 'whole pill' she is not certain re: dosing. She does not have her medication bottles with her today. She does complain that she has been more tired still and she has to take frequent naps throughout the day.     Osteopenia  Per patient, last Bone Scan showed osteopenia. She does not take Ca or Vit D, only a daily multi. Denies any recent fall or fracture. She does report she did get done DEXA scan, we still do not have these records to review?        Current medicines (including changes today)  Current Outpatient Prescriptions   Medication Sig Dispense Refill   • levothyroxine (SYNTHROID) 50 MCG Tab Take 1 Tab by mouth Every morning on an empty stomach. ON EMPTY STOMACH 90 Tab 0   • Multiple Vitamins-Minerals (MULTIVITAMIN ADULTS 50+) Tab Take 1 Tab by mouth every day. 30 Tab 0     No current facility-administered medications for this visit.        She  has a past medical history of Left-sided thoracic back pain (5/3/2016) and Thyroid disease.    She  has a past surgical history that includes abdominal exploration (1970); appendectomy; and shoulder arthroscopy w/ rotator cuff repair (Left, 2010).     Social History     Social History   • Marital status:      Spouse name: N/A   • Number of children: N/A   • Years of education: N/A     Social History Main Topics   • Smoking status: Never Smoker   • Smokeless tobacco: Never Used   • Alcohol use 0.0 oz/week   • Drug use: No   • Sexual  "activity: No     Other Topics Concern   • Not on file     Social History Narrative   • No narrative on file       Family History   Problem Relation Age of Onset   • No Known Problems Mother    • No Known Problems Father          ROS  Positive for tiredness   No fever, no chest pain, no shortness of breath, no abdominal pain, no rashes    All other systems reviewed and are negative.        Objective:     Blood pressure 132/66, pulse 88, temperature 36.2 °C (97.1 °F), resp. rate 18, height 1.702 m (5' 7\"), weight 92.1 kg (203 lb), SpO2 94 %. Body mass index is 31.79 kg/m².    Physical Exam:   Constitutional: Alert, no distress.  Eye: Equal, round and reactive, conjunctiva clear, lids normal.   ENMT: Lips without lesions, oropharynx clear.   Neck: Trachea midline, no masses, no thyromegaly. No cervical or supraclavicular lymphadenopathy  Respiratory: Unlabored respiratory effort, lungs clear to auscultation, no wheezes, no ronchi.  Cardiovascular: Normal S1, S2, no murmur, no edema.   Abdomen: Soft, non-tender, no masses, no hepatosplenomegaly. Normal bowel sounds.   Skin: Warm, dry, good turgor, no rashes in visible areas.  Neuro:  normal sensation in extremities.   Psych: Alert and oriented x3, normal affect and mood.        Assessment and Plan:   The following treatment plan was discussed    1. Acquired hypothyroidism  Discussed with patient it is difficult to manage her thyroid if she is taking her  Medication not as prescribed. She is convinced that the lower dosing made her feel better, so at this time we will check her TSH today and then decrease to 50 mcg. Pending results, will consider treatment adjustment? I also did discuss patient that at her age, taking naps throughout the day may be normal for her.   - levothyroxine (SYNTHROID) 50 MCG Tab; Take 1 Tab by mouth Every morning on an empty stomach. ON EMPTY STOMACH  Dispense: 90 Tab; Refill: 0  - TSH; Future  - TSH; Future    2. Need for vaccination  - " Pneumococal Polysaccharide Vaccine 23-Valent =>3yo SQ/IM  - INFLUENZA VACCINE, HIGH DOSE (65+ ONLY)    3. Screening for colon cancer  - OCCULT BLOOD FECES IMMUNOASSAY; Future    4. Osteopenia, unspecified location  Will again try to obtain DEXA results?     5. Noncompliance with medications    6. Screening for breast cancer  - [id number]; Future      Reviewed indication, dosage, usage and potential adverse effects of prescribed medications. Patient appears to understand, verbalizes understanding and is willing to try medications as prescribed.      Reviewed risks and benefits of treatment plan. Patient verbally agrees to plan of care.       Followup: Return in about 2 months (around 11/4/2018).    CHAD Saleh.     PLEASE NOTE: This dictation was created using voice recognition software. I have made every reasonable attempt to correct obvious errors, but I expect that there may be errors of grammar and possibly content that I did not discover prior finalizing this note.

## 2018-11-13 ENCOUNTER — OFFICE VISIT (OUTPATIENT)
Dept: MEDICAL GROUP | Facility: CLINIC | Age: 83
End: 2018-11-13
Payer: MEDICARE

## 2018-11-13 VITALS
OXYGEN SATURATION: 97 % | BODY MASS INDEX: 32.68 KG/M2 | HEART RATE: 70 BPM | TEMPERATURE: 97.9 F | SYSTOLIC BLOOD PRESSURE: 140 MMHG | HEIGHT: 67 IN | DIASTOLIC BLOOD PRESSURE: 82 MMHG | WEIGHT: 208.2 LBS

## 2018-11-13 DIAGNOSIS — E03.9 ACQUIRED HYPOTHYROIDISM: ICD-10-CM

## 2018-11-13 DIAGNOSIS — M54.6 ACUTE RIGHT-SIDED THORACIC BACK PAIN: ICD-10-CM
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
## 2018-11-13 DIAGNOSIS — M79.641 RIGHT HAND PAIN: ICD-10-CM

## 2018-11-13 PROCEDURE — 99214 OFFICE O/P EST MOD 30 MIN: CPT | Performed by: NURSE PRACTITIONER

## 2018-11-13 ASSESSMENT — PAIN SCALES - GENERAL: PAINLEVEL: 10=SEVERE PAIN

## 2018-11-13 NOTE — PROGRESS NOTES
Subjective:     Carolina Castaneda is a 86 y.o. female here today for evaluation management the following:    Acquired hypothyroidism  This is a chronic problem which is now well controlled with levothyroxine 50 MCG daily.  We did recently over the last few months adjust her dosing but it appears to be leveled out as her most recent TSH is within normal limits and patient reports she is feeling quite well.    Acute right-sided thoracic back pain  See fall note.    Right hand pain  See fall note.    Fall  This is a new problem.  Patient reports that 10 days ago she was walking in her bedroom and ended up tripping on the side of the bed.  Patient reports that she fell face forward, trying to catch herself on her right hand.  She reports that since that time her right hand pinky has been hurting, although it was swollen initially it appears to have gotten better although the pain persists.  Patient also reports that in her upper right back she has been having pain.  She reports she is been using the heating pad which does appear to help and also aspirin which she reports does not appear to help much.  She denies hitting her head during the fall, denies any loss of consciousness.  She was not able to get up on her own but her son did help her without complication.  She reports that she did report to the ER in [place], she waited for about an hour and then decided to leave because the emergency room was so busy.  Patient reports that she is sleeping okay other than a little bit of pain.  She is currently walking without any assistive device.       Current medicines (including changes today)  Current Outpatient Prescriptions   Medication Sig Dispense Refill   • ASPIRIN PO Take  by mouth.     • levothyroxine (SYNTHROID) 50 MCG Tab Take 1 Tab by mouth Every morning on an empty stomach. ON EMPTY STOMACH 90 Tab 0   • Multiple Vitamins-Minerals (MULTIVITAMIN ADULTS 50+) Tab Take 1 Tab by mouth every day. 30 Tab 0     No  "current facility-administered medications for this visit.        She  has a past medical history of Left-sided thoracic back pain (5/3/2016) and Thyroid disease.    She  has a past surgical history that includes abdominal exploration (1970); appendectomy; and shoulder arthroscopy w/ rotator cuff repair (Left, 2010).     Social History     Social History   • Marital status:      Spouse name: N/A   • Number of children: N/A   • Years of education: N/A     Social History Main Topics   • Smoking status: Never Smoker   • Smokeless tobacco: Never Used   • Alcohol use 0.0 oz/week   • Drug use: No   • Sexual activity: No     Other Topics Concern   • Not on file     Social History Narrative   • No narrative on file       Family History   Problem Relation Age of Onset   • No Known Problems Mother    • No Known Problems Father          ROS  Positive for right hand pain, middle upper back pain.  Denies any dizziness or change in vision.  No fever, no chest pain, no shortness of breath, no abdominal pain, no rashes    All other systems reviewed and are negative.        Objective:     Blood pressure 140/82, pulse 70, temperature 36.6 °C (97.9 °F), temperature source Temporal, height 1.702 m (5' 7\"), weight 94.4 kg (208 lb 3.2 oz), SpO2 97 %. Body mass index is 32.61 kg/m².    Physical Exam:   Constitutional: Alert, no distress.  Gait is slow and cautious, no assistive device.  She is hunched over which is her normal stable behavior.  Eye: Equal, round and reactive, conjunctiva clear, lids normal.   Respiratory: Unlabored respiratory effort, lungs clear to auscultation, no wheezes, no ronchi.  Cardiovascular: Normal S1, S2, no murmur, no edema.   Abdomen: Soft, non-tender, no masses, no hepatosplenomegaly. Normal bowel sounds.   Skin: Warm, dry, good turgor, no rashes in visible areas.  Psych: Alert and oriented x3, normal affect and mood.  Spine/Back Exam:   Denies changes in bowel/bladder. No numbness to perineal area. "   Straight Leg Test negative   DTR 2+ patella  Strength 5/5 prox and distal LE.     Moderate tenderness in paraspinous muscles thoracic spine with current spasm on right. No spinous process tenderness.     No pain with palpation of lumbar area.  No pain with palpation of cervical spine.  Neck range of motion is intact.      Hand exam:   Side of involvement: right  Obvious deformity: No   Swelling: no  Erythema: No           Ecchymosis: no  There is tenderness to palpation of right pinky with flexion  Crepitus: no  Active ROM: intact but with pain of right pinky   : decreased due to pain          Neuro: normal, sensation intact    Elbow/Proximal forearm involvement? no  Elbow:  Swelling: no  Erythema: No           Ecchymosis: no  Active ROM: intact    Physical exam of Wrist/Elbow of unaffected limb is normal.     X-RAY: pending       Assessment and Plan:   The following treatment plan was discussed    1. Fall, initial encounter  Patient admits that at times because of her fall it is difficult for her to breathe in and out.  This is likely due to just pain, however, advised to obtain chest x-ray along with thoracic spine and also hand x-ray to rule out any bony abnormality or break.  I did discuss with patient signs or symptoms to seek emergent care.  Advised for NSAIDs as needed for pain and also heat.  She does appear to have a muscle spasm in her right upper back.  She will follow-up with me within 1-2 weeks or sooner as needed.  - DX-CHEST-2 VIEWS; Future    2. Acute right-sided thoracic back pain  - DX-THORACIC SPINE-2 VIEWS; Future    3. Right hand pain  - DX-HAND 3+ RIGHT; Future    4. Acquired hypothyroidism  Stable, continue with levothyroxine 50 MCG daily.      Reviewed indication, dosage, usage and potential adverse effects of prescribed medications. Patient appears to understand, verbalizes understanding and is willing to try medications as prescribed.      Reviewed risks and benefits of treatment  plan. Patient verbally agrees to plan of care.       Followup: Return in about 2 weeks (around 11/27/2018).    CHAD Saleh.     PLEASE NOTE: This dictation was created using voice recognition software. I have made every reasonable attempt to correct obvious errors, but I expect that there may be errors of grammar and possibly content that I did not discover prior finalizing this note.

## 2018-11-13 NOTE — ASSESSMENT & PLAN NOTE
This is a chronic problem which is now well controlled with levothyroxine 50 MCG daily.  We did recently over the last few months adjust her dosing but it appears to be leveled out as her most recent TSH is within normal limits and patient reports she is feeling quite well.

## 2018-11-13 NOTE — ASSESSMENT & PLAN NOTE
This is a new problem.  Patient reports that 10 days ago she was walking in her bedroom and ended up tripping on the side of the bed.  Patient reports that she fell face forward, trying to catch herself on her right hand.  She reports that since that time her right hand pinky has been hurting, although it was swollen initially it appears to have gotten better although the pain persists.  Patient also reports that in her upper right back she has been having pain.  She reports she is been using the heating pad which does appear to help and also aspirin which she reports does not appear to help much.  She denies hitting her head during the fall, denies any loss of consciousness.  She was not able to get up on her own but her son did help her without complication.  She reports that she did report to the ER in [place], she waited for about an hour and then decided to leave because the emergency room was so busy.  Patient reports that she is sleeping okay other than a little bit of pain.  She is currently walking without any assistive device.

## 2018-12-03 ENCOUNTER — TELEPHONE (OUTPATIENT)
Dept: MEDICAL GROUP | Facility: CLINIC | Age: 83
End: 2018-12-03

## 2018-12-03 DIAGNOSIS — E03.9 ACQUIRED HYPOTHYROIDISM: ICD-10-CM

## 2018-12-04 RX ORDER — LEVOTHYROXINE SODIUM 0.05 MG/1
50 TABLET ORAL
Qty: 90 TAB | Refills: 1 | Status: SHIPPED | OUTPATIENT
Start: 2018-12-04

## 2021-01-11 DIAGNOSIS — Z23 NEED FOR VACCINATION: ICD-10-CM
